# Patient Record
Sex: MALE
[De-identification: names, ages, dates, MRNs, and addresses within clinical notes are randomized per-mention and may not be internally consistent; named-entity substitution may affect disease eponyms.]

---

## 2019-03-08 ENCOUNTER — HOSPITAL ENCOUNTER (EMERGENCY)
Dept: HOSPITAL 92 - ERS | Age: 34
LOS: 1 days | Discharge: HOME | End: 2019-03-09
Payer: SELF-PAY

## 2019-03-08 DIAGNOSIS — R11.2: Primary | ICD-10-CM

## 2019-03-08 DIAGNOSIS — R19.7: ICD-10-CM

## 2019-03-08 LAB
ALBUMIN SERPL BCG-MCNC: 5.4 G/DL (ref 3.5–5)
ALP SERPL-CCNC: 94 U/L (ref 40–150)
ALT SERPL W P-5'-P-CCNC: 94 U/L (ref 8–55)
ANION GAP SERPL CALC-SCNC: 17 MMOL/L (ref 10–20)
AST SERPL-CCNC: 51 U/L (ref 5–34)
BASOPHILS # BLD AUTO: 0 THOU/UL (ref 0–0.2)
BASOPHILS NFR BLD AUTO: 0.1 % (ref 0–1)
BILIRUB SERPL-MCNC: 2.1 MG/DL (ref 0.2–1.2)
BUN SERPL-MCNC: 18 MG/DL (ref 8.9–20.6)
CALCIUM SERPL-MCNC: 10.2 MG/DL (ref 7.8–10.44)
CHLORIDE SERPL-SCNC: 105 MMOL/L (ref 98–107)
CO2 SERPL-SCNC: 23 MMOL/L (ref 22–29)
CREAT CL PREDICTED SERPL C-G-VRATE: 0 ML/MIN (ref 70–130)
CRYSTAL-AUWI FLAG: 0.2 (ref 0–15)
EOSINOPHIL # BLD AUTO: 0 THOU/UL (ref 0–0.7)
EOSINOPHIL NFR BLD AUTO: 0.5 % (ref 0–10)
GLOBULIN SER CALC-MCNC: 3.4 G/DL (ref 2.4–3.5)
GLUCOSE SERPL-MCNC: 141 MG/DL (ref 70–105)
HEV IGM SER QL: 4.3 (ref 0–7.99)
HGB BLD-MCNC: 16.5 G/DL (ref 14–18)
HYALINE CASTS #/AREA URNS LPF: (no result) LPF
LYMPHOCYTES # BLD: 0.6 THOU/UL (ref 1.2–3.4)
LYMPHOCYTES NFR BLD AUTO: 6.3 % (ref 21–51)
MCH RBC QN AUTO: 27.7 PG (ref 27–31)
MCV RBC AUTO: 84.3 FL (ref 78–98)
MONOCYTES # BLD AUTO: 0.4 THOU/UL (ref 0.11–0.59)
MONOCYTES NFR BLD AUTO: 4.1 % (ref 0–10)
NEUTROPHILS # BLD AUTO: 8.6 THOU/UL (ref 1.4–6.5)
NEUTROPHILS NFR BLD AUTO: 89 % (ref 42–75)
PATHC CAST-AUWI FLAG: 0.54 (ref 0–2.49)
PLATELET # BLD AUTO: 252 THOU/UL (ref 130–400)
POTASSIUM SERPL-SCNC: 3.9 MMOL/L (ref 3.5–5.1)
PROT UR STRIP.AUTO-MCNC: 30 MG/DL
RBC # BLD AUTO: 5.96 MILL/UL (ref 4.7–6.1)
RBC UR QL AUTO: (no result) HPF (ref 0–3)
SODIUM SERPL-SCNC: 141 MMOL/L (ref 136–145)
SP GR UR STRIP: 1.03 (ref 1–1.04)
SPERM-AUWI FLAG: 0 (ref 0–9.9)
WBC # BLD AUTO: 9.6 THOU/UL (ref 4.8–10.8)
WBC UR QL AUTO: (no result) HPF (ref 0–3)
YEAST-AUWI FLAG: 0 (ref 0–25)

## 2019-03-08 PROCEDURE — 96374 THER/PROPH/DIAG INJ IV PUSH: CPT

## 2019-03-08 PROCEDURE — C9113 INJ PANTOPRAZOLE SODIUM, VIA: HCPCS

## 2019-03-08 PROCEDURE — 87086 URINE CULTURE/COLONY COUNT: CPT

## 2019-03-08 PROCEDURE — 81003 URINALYSIS AUTO W/O SCOPE: CPT

## 2019-03-08 PROCEDURE — 96372 THER/PROPH/DIAG INJ SC/IM: CPT

## 2019-03-08 PROCEDURE — 96375 TX/PRO/DX INJ NEW DRUG ADDON: CPT

## 2019-03-08 PROCEDURE — 36415 COLL VENOUS BLD VENIPUNCTURE: CPT

## 2019-03-08 PROCEDURE — 36416 COLLJ CAPILLARY BLOOD SPEC: CPT

## 2019-03-08 PROCEDURE — 80053 COMPREHEN METABOLIC PANEL: CPT

## 2019-03-08 PROCEDURE — 80074 ACUTE HEPATITIS PANEL: CPT

## 2019-03-08 PROCEDURE — 85025 COMPLETE CBC W/AUTO DIFF WBC: CPT

## 2019-03-08 PROCEDURE — 76705 ECHO EXAM OF ABDOMEN: CPT

## 2019-03-08 PROCEDURE — 81015 MICROSCOPIC EXAM OF URINE: CPT

## 2019-03-08 NOTE — ULT
RIGHT UPPER QUADRANT ULTRASOUND:

3/8/19

 

INDICATION:

Abdominal pain.

 

COMPARISON:  

None.

 

FINDINGS:  

Overlying bowel gas slightly limits the exam. Pancreas is not well seen. 

 

Liver is diffusely echogenic. 

 

Visualized gallbladder is unremarkable. No sonographic Pineda's sign is reported. The common bile bianca
t measured 5 mm. 

 

The right kidney measures 12 cm in length.

 

IMPRESSION:  

1.      Fatty liver. 

 

2.      Limitation of exam due to overlying bowel gas. 

 

POS: RAYA

## 2019-03-09 LAB
HBCM INDEX: 0.05 S/CO (ref 0–0.79)
HBSAG INDEX: 0.23 S/CO (ref 0–0.99)
HEP A IGM S/CO: 0.17 S/CO (ref 0–0.79)
HEP C INDEX: 0.07 S/CO (ref 0–0.79)

## 2022-01-05 ENCOUNTER — HOSPITAL ENCOUNTER (EMERGENCY)
Dept: HOSPITAL 92 - ERS | Age: 37
Discharge: HOME | End: 2022-01-05
Payer: SELF-PAY

## 2022-01-05 DIAGNOSIS — F17.200: ICD-10-CM

## 2022-01-05 DIAGNOSIS — U07.1: Primary | ICD-10-CM

## 2022-01-05 PROCEDURE — 87804 INFLUENZA ASSAY W/OPTIC: CPT

## 2022-01-05 PROCEDURE — U0003 INFECTIOUS AGENT DETECTION BY NUCLEIC ACID (DNA OR RNA); SEVERE ACUTE RESPIRATORY SYNDROME CORONAVIRUS 2 (SARS-COV-2) (CORONAVIRUS DISEASE [COVID-19]), AMPLIFIED PROBE TECHNIQUE, MAKING USE OF HIGH THROUGHPUT TECHNOLOGIES AS DESCRIBED BY CMS-2020-01-R: HCPCS

## 2022-01-05 PROCEDURE — U0005 INFEC AGEN DETEC AMPLI PROBE: HCPCS

## 2022-01-05 PROCEDURE — 99284 EMERGENCY DEPT VISIT MOD MDM: CPT

## 2022-01-06 LAB — SARS-COV-2 RNA RESP QL NAA+PROBE: DETECTED

## 2022-09-08 ENCOUNTER — HOSPITAL ENCOUNTER (EMERGENCY)
Dept: HOSPITAL 9 - MADERS | Age: 37
Discharge: HOME | End: 2022-09-08
Payer: SELF-PAY

## 2022-09-08 DIAGNOSIS — F17.200: ICD-10-CM

## 2022-09-08 DIAGNOSIS — A08.4: Primary | ICD-10-CM

## 2022-09-08 PROCEDURE — 99283 EMERGENCY DEPT VISIT LOW MDM: CPT

## 2023-09-03 ENCOUNTER — HOSPITAL ENCOUNTER (EMERGENCY)
Dept: HOSPITAL 9 - MADERS | Age: 38
Discharge: HOME | End: 2023-09-03
Payer: SELF-PAY

## 2023-09-03 DIAGNOSIS — U07.1: Primary | ICD-10-CM

## 2023-09-03 DIAGNOSIS — F17.200: ICD-10-CM

## 2023-09-03 PROCEDURE — 99283 EMERGENCY DEPT VISIT LOW MDM: CPT

## 2025-04-24 ENCOUNTER — HOSPITAL ENCOUNTER (EMERGENCY)
Dept: HOSPITAL 92 - ERS | Age: 40
Discharge: HOME | End: 2025-04-24
Payer: SELF-PAY

## 2025-04-24 DIAGNOSIS — Z75.8: ICD-10-CM

## 2025-04-24 DIAGNOSIS — R13.10: Primary | ICD-10-CM

## 2025-04-24 DIAGNOSIS — Z55.6: ICD-10-CM

## 2025-04-24 DIAGNOSIS — F17.210: ICD-10-CM

## 2025-04-24 DIAGNOSIS — F17.290: ICD-10-CM

## 2025-04-24 LAB
ALBUMIN SERPL BCG-MCNC: 4.6 G/DL (ref 3.1–4.5)
ALP SERPL-CCNC: 99 U/L (ref 40–110)
ALT SERPL W P-5'-P-CCNC: 54 U/L (ref ?–45)
ANION GAP SERPL CALC-SCNC: 13 MMOL/L (ref 10–20)
AST SERPL-CCNC: 39 U/L (ref 11–34)
BASOPHILS # BLD AUTO: 0.11 10X3/UL (ref 0–0.2)
BASOPHILS NFR BLD AUTO: 0.9 % (ref 0–1)
BILIRUB SERPL-MCNC: 0.8 MG/DL (ref 0.3–1.2)
BUN SERPL-MCNC: 16 MG/DL (ref 8.9–20.6)
CHLORIDE SERPL-SCNC: 107 MMOL/L (ref 98–107)
CO2 SERPL-SCNC: 23 MMOL/L (ref 22–29)
CREAT CL PREDICTED SERPL C-G-VRATE: 0 ML/MIN (ref 70–130)
EOSINOPHIL NFR BLD AUTO: 2.5 % (ref 0–10)
GLOBULIN SER CALC-MCNC: 3.1 G/DL (ref 2.4–3.5)
GLUCOSE SERPL-MCNC: 108 MG/DL (ref 70–105)
HCT VFR BLD CALC: 46.9 % (ref 42–52)
HGB BLD-MCNC: 16.1 G/DL (ref 14–18)
LYMPHOCYTES NFR BLD AUTO: 28.6 % (ref 21–51)
MCH RBC QN AUTO: 28.5 PG (ref 27–31)
MCV RBC AUTO: 83.2 FL (ref 78–98)
MONOCYTES NFR BLD AUTO: 6.8 % (ref 0–10)
PLATELET # BLD AUTO: 250 10X3/UL (ref 130–400)
POTASSIUM SERPL-SCNC: 4.2 MMOL/L (ref 3.5–5.1)
RBC # BLD AUTO: 5.64 MILL/UL (ref 4.7–6.1)
SODIUM SERPL-SCNC: 139 MMOL/L (ref 136–145)
TROPONIN I SERPL DL<=0.01 NG/ML-MCNC: (no result) NG/ML (ref ?–0.03)

## 2025-04-24 PROCEDURE — 36415 COLL VENOUS BLD VENIPUNCTURE: CPT

## 2025-04-24 PROCEDURE — 84484 ASSAY OF TROPONIN QUANT: CPT

## 2025-04-24 PROCEDURE — 71045 X-RAY EXAM CHEST 1 VIEW: CPT

## 2025-04-24 PROCEDURE — 96374 THER/PROPH/DIAG INJ IV PUSH: CPT

## 2025-04-24 PROCEDURE — 96375 TX/PRO/DX INJ NEW DRUG ADDON: CPT

## 2025-04-24 PROCEDURE — 80053 COMPREHEN METABOLIC PANEL: CPT

## 2025-04-24 PROCEDURE — 85025 COMPLETE CBC W/AUTO DIFF WBC: CPT

## 2025-04-24 PROCEDURE — 93005 ELECTROCARDIOGRAM TRACING: CPT

## 2025-04-24 SDOH — EDUCATIONAL SECURITY - EDUCATION ATTAINMENT: PROBLEMS RELATED TO HEALTH LITERACY: Z55.6
